# Patient Record
Sex: MALE | Race: WHITE
[De-identification: names, ages, dates, MRNs, and addresses within clinical notes are randomized per-mention and may not be internally consistent; named-entity substitution may affect disease eponyms.]

---

## 2017-09-06 ENCOUNTER — HOSPITAL ENCOUNTER (EMERGENCY)
Dept: HOSPITAL 24 - ER | Age: 52
Discharge: HOME | End: 2017-09-06
Payer: COMMERCIAL

## 2017-09-06 VITALS
DIASTOLIC BLOOD PRESSURE: 55 MMHG | SYSTOLIC BLOOD PRESSURE: 148 MMHG | SYSTOLIC BLOOD PRESSURE: 148 MMHG | DIASTOLIC BLOOD PRESSURE: 55 MMHG

## 2017-09-06 VITALS — BODY MASS INDEX: 27.7 KG/M2

## 2017-09-06 DIAGNOSIS — L30.8: Primary | ICD-10-CM

## 2017-09-06 PROCEDURE — 99282 EMERGENCY DEPT VISIT SF MDM: CPT

## 2017-09-06 PROCEDURE — 99281 EMR DPT VST MAYX REQ PHY/QHP: CPT

## 2017-09-06 NOTE — DR.GENAD
HPI





- PCP


Primary Care Physician: ROBB





- Complaint/Symptoms


Chief Complaint Doctors Comments: Patient was seen by his physicial hoang in 

the week. He was given Rx for skin rash; patient states that the Rx cost $300 

and he could not afford it. He has had the rash for several days.


Chief Complaint:: "FLEA BITES ALL OVER."


Self Treatment fo Chief Complaint: NONE





- Source


History Provided: Patient





- Mode of Arrival


Mode of Arrival: Ambulatory





- Timing


Onset of Chief Complaint: 09/05/17





PMH





- PMH


Past Medical History: No


Past Medical History: Hypertension


Past Surgical History: Yes


Past Surgical History Comment: BACK SURGERY





- Family History


History of Family Medical Conditions: Yes


Family Medical History: Hypertension





- Social History


Does patient currently use any type of tobacco product: Yes


Have you used tobacco products in the last 12 months: Yes


Type of Tobacco Use: Smokeless


Does any household member use tobacco: No


Alcohol Use: None


Do you use any recreational Drugs:: No


Lives With: Family


Lives Where: Home





- infectious screening


In the last 2 months have you had wt loss of >10#?: NO


Have you had fever, night sweats or hemotysis?: No


Have you traveled outside the country in the last 6 months?: No





ROS





- Review of Systems


Eyes: No Symptoms Reported


ENTM: No Symptoms Reported


Respiratoy: No Symptoms Reported


Cardiovascular: No Symptoms Reported


Gastrointestinal/Abdominal: No Symptoms Reported


Genitourinary: No Symptoms Reported


Neurological: No Symptoms Reported


Musculoskeletal: No Symptoms Reported


Integumentary: Lumps, Rash, Itching


Hematologic/Lymphatic: No Symptoms Reported


Endocrine: No Symptoms Reported


Psychiatric: No Symptoms Reported


All Other Systems: Reviewed and Negative





PE





- Vital Signs


Vitals: 





 





Temperature                      97.1 F


Pulse Rate                       65


Respiratory Rate                 20


Blood Pressure                   148/55


O2 Sat by Pulse Oximetry         98











- General


General Appearance: Alert, In No Apparent Distress





- Head


Head Exam: Normal Inspection, Atraumatic





- Eyes


Eye exam: Normal Appearance





- ENT


ENT Exam: Normal Exam


External Ear Exam: Normal External Inspection


TM/Canal Exam: Bilateral Normal


Nose Exam: Normal Nose Exam


Mouth Exam: Normal Inspection


Throat Exam: Normal Inspection





- Neck


Neck Exam: Normal Inspection





- Chest


Chest Inspection: Normal Inspection





- Respiratory


Respiratory Exam: Normal Lung Sounds Bilat


Respiratory Exam: Bilateral Clear to Auscultation





- Cardiovascular


Cardiovascular Exam: Regular Rate





- Abdominal Exam


Abdominal Exam: Normal Inspection


Abdominal Tenderness: RUQ, RLQ





- Extremities


Extremities Exam: Normal Inspection





- Back


Back Exam: Normal Inspection, Full ROM





- Neurologic


Neurological Exam: Alert, Oriented X3, CN II-XII Intact





- Psychiatric


Psychiatric Exam: Normal Affect





- Skin


Skin Exam: Warm, Rash (large plaques, papules generalized)





- Diagnosis


Discharge Problem: 


 Dermatitis








- Discharge Plan


Condition: Good





- Follow ups/Referrals


Follow ups/Referrals: 


ABDIEL ZAMUDIO [Primary Care Provider] - 3 days





- Instructions

## 2018-05-17 ENCOUNTER — HOSPITAL ENCOUNTER (OUTPATIENT)
Dept: HOSPITAL 24 - RAD | Age: 53
Discharge: HOME | DRG: 552 | End: 2018-05-17
Attending: PSYCHIATRY & NEUROLOGY
Payer: COMMERCIAL

## 2018-05-17 DIAGNOSIS — M50.90: Primary | ICD-10-CM

## 2018-05-17 DIAGNOSIS — M48.02: ICD-10-CM

## 2018-05-17 DIAGNOSIS — M47.892: ICD-10-CM

## 2018-05-17 DIAGNOSIS — M51.9: ICD-10-CM

## 2018-05-17 PROCEDURE — 72141 MRI NECK SPINE W/O DYE: CPT

## 2018-05-17 PROCEDURE — 72148 MRI LUMBAR SPINE W/O DYE: CPT

## 2018-05-17 NOTE — MRI
STUDY: MRI OF THE CERVICAL SPINE



HISTORY:  Unspecified cervical disc disorder. Neck pain. Low back pain that radiates to right leg.



Comparison:  None.



Technique: An MRI of the cervical spine including sagittal T1, T2, and T2 STIR, axial T1, and T2 FSE 
images was performed using standard departmental protocol. 



Findings: 



Sagittal images: 



The craniocervical junction is unremarkable.  Vertebral body heights and alignment are within normal 
limits. There is mild multilevel degenerative disc disease and degenerative endplate change. There is
 Modic type 1 change in the opposing endplates at C5/6. There is no significant prevertebral soft tis
justina swelling.  The surrounding paraspinal soft tissues are unremarkable.  There is no evidence of cor
d compression.  No intrinsic signal abnormalities are identified in the spinal cord itself.



Axial images:

C2 -- C3: Normal.



C3 -- C4: There is a posterior disc osteophyte complex and bilateral uncovertebral osteophyte formati
on. This results in mild central canal stenosis. The neural foramina are adequate.



C4 -- C5: There is a posterior disc osteophyte complex and bilateral uncovertebral osteophyte formati
on. This results in mild spinal stenosis. The neural foramina are adequate.



C5 -- C6: There is posterior disc osteophyte complex and bilateral uncovertebral osteophyte formation
. This results in mild-to-moderate central canal stenosis. There is mild bilateral neural foraminal s
tenosis.



C6 -- C7: There is a posterior disc osteophyte complex and bilateral uncovertebral osteophyte formati
on. The central canal and neural foramina appear adequate.



C7 -- T1: Normal.



IMPRESSION:



1. Mild multilevel cervical spondylosis as described, probably most notable at C5/6



2. Mild to moderate spinal stenosis at C5/6.



3. Mild bilateral neural foraminal stenosis at C5/6.





Reported By:Electronically Signed by SUKUMAR LEMUS MD at 5/17/2018 3:25:47 PM

## 2018-05-17 NOTE — MRI
STUDY:  MRI OF THE LUMBAR SPINE



HISTORY:  Unspecified thoracic and lumbosacral intervertebral disc disorder. Low back pain radiating 
to the right leg. Neck pain.



Comparison:  None.



Technique: Multiplanar multi-sequence MRI of the lumbar spine was performed.  Sagittal T1, sagittal T
2, and STIR images, axial T1, and axial T2 images were obtained. 



Findings:



Sagittal images: 



Vertebral body heights and alignment are within normal limits. Marrow signal is heterogeneous.  There
 is multilevel degenerative disc disease and degenerative endplate change. Most degenerative endplate
 change appears chronic.



The conus medullaris is normal in appearance terminating at the level of L1/2.



Axial images:

T12 -- L1: There is bilateral facet arthropathy and ligamentum flavum infolding. The central canal an
d neural foramina are adequate.



L1 -- L2: There is bilateral facet arthropathy and ligamentum flavum infolding. The central canal and
 neural foramina are adequate.



L2 -- L3: There is a broad-based disc bulge, bilateral facet arthropathy and ligamentum flavum infold
ing. The central canal and neural foramina are adequate.



L3 -- L4: There is a broad-based disc bulge, bilateral facet arthropathy and ligamentum flavum infold
ing. The central canal is adequate. There is mild bilateral neural foraminal stenosis.



L4 -- L5: There is a broad-based disc bulge, bilateral facet arthropathy and ligamentum flavum infold
ing. This results in mild central canal stenosis. There is mild bilateral neural foraminal stenosis.



L5 -- S1: There is a broad-based disc bulge, bilateral facet arthropathy and ligamentum flavum infold
ing. The central canal is adequate. There is moderate right and severe left neural foraminal stenosis
 at this level.



IMPRESSION:



1. Multilevel lumbar spondylosis as described above.



2. Mild spinal stenosis at L4/5.



3. Neural foraminal stenosis as described, most severe at L5/S1 on the left.









Reported By:Electronically Signed by SUKUMAR LEMUS MD at 5/17/2018 2:41:47 PM